# Patient Record
Sex: MALE | Race: WHITE | NOT HISPANIC OR LATINO | Employment: OTHER
[De-identification: names, ages, dates, MRNs, and addresses within clinical notes are randomized per-mention and may not be internally consistent; named-entity substitution may affect disease eponyms.]

---

## 2017-01-30 ENCOUNTER — FOLLOW UP (OUTPATIENT)
Age: 82
End: 2017-01-30

## 2017-01-30 DIAGNOSIS — H40.1130: ICD-10-CM

## 2017-01-30 DIAGNOSIS — Z96.1: ICD-10-CM

## 2017-01-30 DIAGNOSIS — H35.3211: ICD-10-CM

## 2017-01-30 DIAGNOSIS — H43.813: ICD-10-CM

## 2017-01-30 DIAGNOSIS — H35.3221: ICD-10-CM

## 2017-01-30 PROCEDURE — 92250 FUNDUS PHOTOGRAPHY W/I&R: CPT

## 2017-01-30 PROCEDURE — 92235 FLUORESCEIN ANGRPH MLTIFRAME: CPT

## 2017-01-30 PROCEDURE — 92134 CPTRZ OPH DX IMG PST SGM RTA: CPT

## 2017-01-30 PROCEDURE — 92014 COMPRE OPH EXAM EST PT 1/>: CPT

## 2017-01-30 ASSESSMENT — TONOMETRY
OD_IOP_MMHG: 23
OS_IOP_MMHG: 18

## 2017-01-30 ASSESSMENT — VISUAL ACUITY
OD_SC: 20/30+2
OS_SC: 20/200

## 2017-02-06 ENCOUNTER — FOLLOW UP (OUTPATIENT)
Age: 82
End: 2017-02-06

## 2017-02-06 DIAGNOSIS — H35.3221: ICD-10-CM

## 2017-02-06 DIAGNOSIS — H35.3211: ICD-10-CM

## 2017-02-06 PROCEDURE — 67028 INJECTION EYE DRUG: CPT | Mod: 50

## 2017-02-06 ASSESSMENT — VISUAL ACUITY
OS_SC: 20/400
OS_PH: 20/200
OD_SC: 20/20-2

## 2017-02-06 ASSESSMENT — TONOMETRY
OD_IOP_MMHG: 20
OS_IOP_MMHG: 15

## 2017-02-10 ENCOUNTER — FOLLOW UP (OUTPATIENT)
Age: 82
End: 2017-02-10

## 2017-02-10 DIAGNOSIS — H35.3211: ICD-10-CM

## 2017-02-10 DIAGNOSIS — H43.813: ICD-10-CM

## 2017-02-10 DIAGNOSIS — H35.3221: ICD-10-CM

## 2017-02-10 PROCEDURE — 92012 INTRM OPH EXAM EST PATIENT: CPT

## 2017-02-10 ASSESSMENT — TONOMETRY
OD_IOP_MMHG: 24
OD_IOP_MMHG: 23

## 2017-02-10 ASSESSMENT — VISUAL ACUITY: OD_SC: 20/25-1

## 2017-02-23 ENCOUNTER — UNSCHEDULED FOLLOW UP (OUTPATIENT)
Age: 82
End: 2017-02-23

## 2017-02-23 DIAGNOSIS — H04.123: ICD-10-CM

## 2017-02-23 DIAGNOSIS — H43.813: ICD-10-CM

## 2017-02-23 DIAGNOSIS — H35.3221: ICD-10-CM

## 2017-02-23 DIAGNOSIS — H35.3211: ICD-10-CM

## 2017-02-23 DIAGNOSIS — H01.003: ICD-10-CM

## 2017-02-23 PROCEDURE — 92012 INTRM OPH EXAM EST PATIENT: CPT

## 2017-02-23 ASSESSMENT — TONOMETRY: OD_IOP_MMHG: 18

## 2017-02-23 ASSESSMENT — VISUAL ACUITY: OD_SC: 20/25-1

## 2017-03-06 ENCOUNTER — FOLLOW UP (OUTPATIENT)
Age: 82
End: 2017-03-06

## 2017-03-06 DIAGNOSIS — H35.3221: ICD-10-CM

## 2017-03-06 DIAGNOSIS — H04.123: ICD-10-CM

## 2017-03-06 DIAGNOSIS — H01.003: ICD-10-CM

## 2017-03-06 DIAGNOSIS — H43.813: ICD-10-CM

## 2017-03-06 DIAGNOSIS — H40.1130: ICD-10-CM

## 2017-03-06 DIAGNOSIS — H35.3211: ICD-10-CM

## 2017-03-06 PROCEDURE — 05MG LUCENTIS 0.5MG PREFILLED SYRINGE

## 2017-03-06 PROCEDURE — 92014 COMPRE OPH EXAM EST PT 1/>: CPT | Mod: 25

## 2017-03-06 PROCEDURE — 92134 CPTRZ OPH DX IMG PST SGM RTA: CPT

## 2017-03-06 PROCEDURE — 67028 INJECTION EYE DRUG: CPT

## 2017-03-06 ASSESSMENT — TONOMETRY
OD_IOP_MMHG: 21
OS_IOP_MMHG: 22

## 2017-03-06 ASSESSMENT — VISUAL ACUITY
OD_PH: 20/30
OD_SC: 20/30-2
OS_SC: 20/400

## 2017-04-03 ENCOUNTER — FOLLOW UP (OUTPATIENT)
Age: 82
End: 2017-04-03

## 2017-04-03 DIAGNOSIS — H04.123: ICD-10-CM

## 2017-04-03 DIAGNOSIS — H35.3221: ICD-10-CM

## 2017-04-03 DIAGNOSIS — H01.003: ICD-10-CM

## 2017-04-03 DIAGNOSIS — H35.3211: ICD-10-CM

## 2017-04-03 DIAGNOSIS — H43.813: ICD-10-CM

## 2017-04-03 DIAGNOSIS — H40.1130: ICD-10-CM

## 2017-04-03 PROCEDURE — 05MG LUCENTIS 0.5MG PREFILLED SYRINGE

## 2017-04-03 PROCEDURE — 92134 CPTRZ OPH DX IMG PST SGM RTA: CPT

## 2017-04-03 PROCEDURE — 67028 INJECTION EYE DRUG: CPT

## 2017-04-03 PROCEDURE — 92014 COMPRE OPH EXAM EST PT 1/>: CPT | Mod: 25

## 2017-04-03 ASSESSMENT — VISUAL ACUITY
OD_SC: 20/25
OD_PH: 20/25+2
OS_SC: 20/400

## 2017-04-03 ASSESSMENT — TONOMETRY
OS_IOP_MMHG: 14
OD_IOP_MMHG: 18

## 2017-05-08 ENCOUNTER — FOLLOW UP (OUTPATIENT)
Age: 82
End: 2017-05-08

## 2017-05-08 DIAGNOSIS — H35.3221: ICD-10-CM

## 2017-05-08 DIAGNOSIS — H43.813: ICD-10-CM

## 2017-05-08 DIAGNOSIS — H40.1130: ICD-10-CM

## 2017-05-08 DIAGNOSIS — H01.003: ICD-10-CM

## 2017-05-08 DIAGNOSIS — H04.123: ICD-10-CM

## 2017-05-08 DIAGNOSIS — H35.3211: ICD-10-CM

## 2017-05-08 PROCEDURE — 92014 COMPRE OPH EXAM EST PT 1/>: CPT | Mod: 25

## 2017-05-08 PROCEDURE — 92134 CPTRZ OPH DX IMG PST SGM RTA: CPT

## 2017-05-08 PROCEDURE — 67028 INJECTION EYE DRUG: CPT

## 2017-05-08 ASSESSMENT — TONOMETRY
OD_IOP_MMHG: 15
OS_IOP_MMHG: 13

## 2017-05-08 ASSESSMENT — VISUAL ACUITY
OD_SC: 20/25-1
OS_SC: 20/400

## 2017-06-05 ENCOUNTER — FOLLOW UP (OUTPATIENT)
Age: 82
End: 2017-06-05

## 2017-06-05 DIAGNOSIS — H35.3211: ICD-10-CM

## 2017-06-05 DIAGNOSIS — H04.123: ICD-10-CM

## 2017-06-05 DIAGNOSIS — H35.3221: ICD-10-CM

## 2017-06-05 DIAGNOSIS — H43.813: ICD-10-CM

## 2017-06-05 DIAGNOSIS — H40.1130: ICD-10-CM

## 2017-06-05 PROCEDURE — 92014 COMPRE OPH EXAM EST PT 1/>: CPT | Mod: 25

## 2017-06-05 PROCEDURE — 92134 CPTRZ OPH DX IMG PST SGM RTA: CPT

## 2017-06-05 PROCEDURE — 67028 INJECTION EYE DRUG: CPT

## 2017-06-05 PROCEDURE — 05MG LUCENTIS 0.5MG PREFILLED SYRINGE

## 2017-06-05 ASSESSMENT — VISUAL ACUITY
OS_CC: 20/200
OD_CC: 20/30

## 2017-06-05 ASSESSMENT — TONOMETRY
OS_IOP_MMHG: 13
OD_IOP_MMHG: 15

## 2017-07-11 ENCOUNTER — FOLLOW UP (OUTPATIENT)
Age: 82
End: 2017-07-11

## 2017-07-11 DIAGNOSIS — H43.813: ICD-10-CM

## 2017-07-11 DIAGNOSIS — H04.123: ICD-10-CM

## 2017-07-11 DIAGNOSIS — H35.3211: ICD-10-CM

## 2017-07-11 DIAGNOSIS — H35.3221: ICD-10-CM

## 2017-07-11 DIAGNOSIS — H40.1130: ICD-10-CM

## 2017-07-11 PROCEDURE — 67028 INJECTION EYE DRUG: CPT

## 2017-07-11 PROCEDURE — 92134 CPTRZ OPH DX IMG PST SGM RTA: CPT

## 2017-07-11 PROCEDURE — 92014 COMPRE OPH EXAM EST PT 1/>: CPT | Mod: 25

## 2017-07-11 PROCEDURE — 05MG LUCENTIS 0.5MG PREFILLED SYRINGE

## 2017-07-11 ASSESSMENT — VISUAL ACUITY
OS_SC: 20/200
OD_SC: 20/25-1
OS_PH: 20/200+1

## 2017-07-11 ASSESSMENT — TONOMETRY
OS_IOP_MMHG: 16
OD_IOP_MMHG: 18

## 2017-08-21 ENCOUNTER — FOLLOW UP (OUTPATIENT)
Age: 82
End: 2017-08-21

## 2017-08-21 DIAGNOSIS — H43.813: ICD-10-CM

## 2017-08-21 DIAGNOSIS — H35.3221: ICD-10-CM

## 2017-08-21 DIAGNOSIS — H35.3211: ICD-10-CM

## 2017-08-21 DIAGNOSIS — H40.1130: ICD-10-CM

## 2017-08-21 DIAGNOSIS — H04.123: ICD-10-CM

## 2017-08-21 PROCEDURE — 05MG LUCENTIS 0.5MG PREFILLED SYRINGE

## 2017-08-21 PROCEDURE — 67028 INJECTION EYE DRUG: CPT

## 2017-08-21 PROCEDURE — 92014 COMPRE OPH EXAM EST PT 1/>: CPT | Mod: 25

## 2017-08-21 PROCEDURE — 92134 CPTRZ OPH DX IMG PST SGM RTA: CPT

## 2017-08-21 ASSESSMENT — VISUAL ACUITY
OS_CC: 20/400
OD_CC: 20/25-2
OS_PH: 20/200

## 2017-08-21 ASSESSMENT — TONOMETRY
OS_IOP_MMHG: 21
OD_IOP_MMHG: 23

## 2017-10-09 ENCOUNTER — FOLLOW UP (OUTPATIENT)
Age: 82
End: 2017-10-09

## 2017-10-09 DIAGNOSIS — H04.123: ICD-10-CM

## 2017-10-09 DIAGNOSIS — H35.3222: ICD-10-CM

## 2017-10-09 DIAGNOSIS — H40.1130: ICD-10-CM

## 2017-10-09 DIAGNOSIS — H43.813: ICD-10-CM

## 2017-10-09 DIAGNOSIS — H35.3211: ICD-10-CM

## 2017-10-09 PROCEDURE — 05MG LUCENTIS 0.5MG PREFILLED SYRINGE

## 2017-10-09 PROCEDURE — 92014 COMPRE OPH EXAM EST PT 1/>: CPT | Mod: 25

## 2017-10-09 PROCEDURE — 92134 CPTRZ OPH DX IMG PST SGM RTA: CPT

## 2017-10-09 PROCEDURE — 92499 UNLISTED OPH SVC/PROCEDURE: CPT

## 2017-10-09 PROCEDURE — 67028 INJECTION EYE DRUG: CPT

## 2017-10-09 ASSESSMENT — VISUAL ACUITY
OS_SC: 20/200
OS_PH: 20/100-1
OD_SC: 20/30+1

## 2017-10-09 ASSESSMENT — TONOMETRY
OS_IOP_MMHG: 15
OD_IOP_MMHG: 17

## 2017-12-04 ENCOUNTER — FOLLOW UP (OUTPATIENT)
Age: 82
End: 2017-12-04

## 2017-12-04 DIAGNOSIS — H40.1130: ICD-10-CM

## 2017-12-04 DIAGNOSIS — H35.3222: ICD-10-CM

## 2017-12-04 DIAGNOSIS — H04.123: ICD-10-CM

## 2017-12-04 DIAGNOSIS — H43.813: ICD-10-CM

## 2017-12-04 DIAGNOSIS — H35.3211: ICD-10-CM

## 2017-12-04 PROCEDURE — 67028 INJECTION EYE DRUG: CPT

## 2017-12-04 PROCEDURE — 92014 COMPRE OPH EXAM EST PT 1/>: CPT | Mod: 25

## 2017-12-04 PROCEDURE — 05MG LUCENTIS 0.5MG PREFILLED SYRINGE

## 2017-12-04 PROCEDURE — 92134 CPTRZ OPH DX IMG PST SGM RTA: CPT

## 2017-12-04 ASSESSMENT — VISUAL ACUITY
OD_SC: 20/40-2
OS_SC: 20/200

## 2017-12-04 ASSESSMENT — TONOMETRY
OS_IOP_MMHG: 12
OD_IOP_MMHG: 16

## 2018-01-29 ENCOUNTER — FOLLOW UP (OUTPATIENT)
Age: 83
End: 2018-01-29

## 2018-01-29 DIAGNOSIS — H43.813: ICD-10-CM

## 2018-01-29 DIAGNOSIS — H35.3222: ICD-10-CM

## 2018-01-29 DIAGNOSIS — H40.1130: ICD-10-CM

## 2018-01-29 DIAGNOSIS — H04.123: ICD-10-CM

## 2018-01-29 DIAGNOSIS — H35.3211: ICD-10-CM

## 2018-01-29 PROCEDURE — 92134 CPTRZ OPH DX IMG PST SGM RTA: CPT

## 2018-01-29 PROCEDURE — 05MG LUCENTIS 0.5MG PREFILLED SYRINGE

## 2018-01-29 PROCEDURE — 92014 COMPRE OPH EXAM EST PT 1/>: CPT | Mod: 25

## 2018-01-29 PROCEDURE — 67028 INJECTION EYE DRUG: CPT

## 2018-01-29 ASSESSMENT — VISUAL ACUITY
OD_SC: 20/30-
OS_SC: 20/400

## 2018-01-29 ASSESSMENT — TONOMETRY
OD_IOP_MMHG: 20
OS_IOP_MMHG: 14

## 2018-03-26 ENCOUNTER — FOLLOW UP (OUTPATIENT)
Age: 83
End: 2018-03-26

## 2018-03-26 DIAGNOSIS — H40.1130: ICD-10-CM

## 2018-03-26 DIAGNOSIS — H35.3211: ICD-10-CM

## 2018-03-26 DIAGNOSIS — H04.123: ICD-10-CM

## 2018-03-26 DIAGNOSIS — H35.3222: ICD-10-CM

## 2018-03-26 DIAGNOSIS — H43.813: ICD-10-CM

## 2018-03-26 PROCEDURE — 92134 CPTRZ OPH DX IMG PST SGM RTA: CPT

## 2018-03-26 PROCEDURE — 67028 INJECTION EYE DRUG: CPT

## 2018-03-26 PROCEDURE — 05MG LUCENTIS 0.5MG PREFILLED SYRINGE

## 2018-03-26 PROCEDURE — 92014 COMPRE OPH EXAM EST PT 1/>: CPT | Mod: 25

## 2018-03-26 ASSESSMENT — TONOMETRY
OD_IOP_MMHG: 13
OS_IOP_MMHG: 12

## 2018-03-26 ASSESSMENT — VISUAL ACUITY
OS_SC: 20/400
OD_SC: 20/40-2

## 2018-05-14 ENCOUNTER — FOLLOW UP (OUTPATIENT)
Age: 83
End: 2018-05-14

## 2018-05-14 DIAGNOSIS — H04.123: ICD-10-CM

## 2018-05-14 DIAGNOSIS — H43.813: ICD-10-CM

## 2018-05-14 DIAGNOSIS — H40.1130: ICD-10-CM

## 2018-05-14 DIAGNOSIS — H35.3211: ICD-10-CM

## 2018-05-14 DIAGNOSIS — H35.3222: ICD-10-CM

## 2018-05-14 PROCEDURE — 92250 FUNDUS PHOTOGRAPHY W/I&R: CPT

## 2018-05-14 PROCEDURE — 92014 COMPRE OPH EXAM EST PT 1/>: CPT | Mod: 25

## 2018-05-14 PROCEDURE — 92134 CPTRZ OPH DX IMG PST SGM RTA: CPT

## 2018-05-14 PROCEDURE — 05MG LUCENTIS 0.5MG PREFILLED SYRINGE

## 2018-05-14 PROCEDURE — 67028 INJECTION EYE DRUG: CPT

## 2018-05-14 ASSESSMENT — TONOMETRY
OS_IOP_MMHG: 12
OD_IOP_MMHG: 15

## 2018-05-14 ASSESSMENT — VISUAL ACUITY
OD_CC: 20/40-2
OS_CC: 20/400

## 2018-06-25 ENCOUNTER — FOLLOW UP (OUTPATIENT)
Age: 83
End: 2018-06-25

## 2018-06-25 DIAGNOSIS — H35.3211: ICD-10-CM

## 2018-06-25 DIAGNOSIS — H43.813: ICD-10-CM

## 2018-06-25 DIAGNOSIS — H40.1130: ICD-10-CM

## 2018-06-25 DIAGNOSIS — H35.3222: ICD-10-CM

## 2018-06-25 DIAGNOSIS — H04.123: ICD-10-CM

## 2018-06-25 PROCEDURE — 67028 INJECTION EYE DRUG: CPT

## 2018-06-25 PROCEDURE — 05MG LUCENTIS 0.5MG PREFILLED SYRINGE

## 2018-06-25 PROCEDURE — 92014 COMPRE OPH EXAM EST PT 1/>: CPT | Mod: 25

## 2018-06-25 PROCEDURE — 92134 CPTRZ OPH DX IMG PST SGM RTA: CPT

## 2018-06-25 ASSESSMENT — TONOMETRY
OS_IOP_MMHG: 10
OD_IOP_MMHG: 14

## 2018-06-25 ASSESSMENT — VISUAL ACUITY
OS_SC: 20/400
OD_SC: 20/40+1

## 2018-08-13 ENCOUNTER — FOLLOW UP (OUTPATIENT)
Age: 83
End: 2018-08-13

## 2018-08-13 DIAGNOSIS — H35.3222: ICD-10-CM

## 2018-08-13 DIAGNOSIS — H04.123: ICD-10-CM

## 2018-08-13 DIAGNOSIS — H43.813: ICD-10-CM

## 2018-08-13 DIAGNOSIS — H35.3211: ICD-10-CM

## 2018-08-13 DIAGNOSIS — H40.1130: ICD-10-CM

## 2018-08-13 PROCEDURE — 05MG LUCENTIS 0.5MG PREFILLED SYRINGE

## 2018-08-13 PROCEDURE — 92250 FUNDUS PHOTOGRAPHY W/I&R: CPT

## 2018-08-13 PROCEDURE — 67028 INJECTION EYE DRUG: CPT

## 2018-08-13 PROCEDURE — 92014 COMPRE OPH EXAM EST PT 1/>: CPT | Mod: 25

## 2018-08-13 PROCEDURE — 92134 CPTRZ OPH DX IMG PST SGM RTA: CPT

## 2018-08-13 ASSESSMENT — VISUAL ACUITY
OD_SC: 20/50
OS_SC: 20/400

## 2018-08-13 ASSESSMENT — TONOMETRY
OD_IOP_MMHG: 15
OS_IOP_MMHG: 13

## 2018-10-08 ENCOUNTER — FOLLOW UP (OUTPATIENT)
Age: 83
End: 2018-10-08

## 2018-10-08 DIAGNOSIS — H40.1130: ICD-10-CM

## 2018-10-08 DIAGNOSIS — H35.3222: ICD-10-CM

## 2018-10-08 DIAGNOSIS — H43.813: ICD-10-CM

## 2018-10-08 DIAGNOSIS — H04.123: ICD-10-CM

## 2018-10-08 DIAGNOSIS — H35.3211: ICD-10-CM

## 2018-10-08 PROCEDURE — 05MG LUCENTIS 0.5MG PREFILLED SYRINGE

## 2018-10-08 PROCEDURE — 92014 COMPRE OPH EXAM EST PT 1/>: CPT | Mod: 25

## 2018-10-08 PROCEDURE — 92134 CPTRZ OPH DX IMG PST SGM RTA: CPT

## 2018-10-08 PROCEDURE — 67028 INJECTION EYE DRUG: CPT

## 2018-10-08 ASSESSMENT — VISUAL ACUITY
OS_SC: 20/200-1
OD_SC: 20/50+1

## 2018-10-08 ASSESSMENT — TONOMETRY
OS_IOP_MMHG: 14
OD_IOP_MMHG: 16

## 2018-12-03 ENCOUNTER — FOLLOW UP (OUTPATIENT)
Age: 83
End: 2018-12-03

## 2018-12-03 DIAGNOSIS — H43.813: ICD-10-CM

## 2018-12-03 DIAGNOSIS — H40.1130: ICD-10-CM

## 2018-12-03 DIAGNOSIS — H35.3222: ICD-10-CM

## 2018-12-03 DIAGNOSIS — H04.123: ICD-10-CM

## 2018-12-03 DIAGNOSIS — H35.3211: ICD-10-CM

## 2018-12-03 PROCEDURE — 05MG LUCENTIS 0.5MG PREFILLED SYRINGE

## 2018-12-03 PROCEDURE — 67028 INJECTION EYE DRUG: CPT

## 2018-12-03 PROCEDURE — 92134 CPTRZ OPH DX IMG PST SGM RTA: CPT

## 2018-12-03 PROCEDURE — 92014 COMPRE OPH EXAM EST PT 1/>: CPT | Mod: 25

## 2018-12-03 ASSESSMENT — VISUAL ACUITY
OD_SC: 20/40-2
OS_SC: 20/200-1

## 2018-12-03 ASSESSMENT — TONOMETRY
OD_IOP_MMHG: 18
OS_IOP_MMHG: 17

## 2019-01-28 ENCOUNTER — FOLLOW UP (OUTPATIENT)
Age: 84
End: 2019-01-28

## 2019-01-28 DIAGNOSIS — H40.1130: ICD-10-CM

## 2019-01-28 DIAGNOSIS — H43.813: ICD-10-CM

## 2019-01-28 DIAGNOSIS — H35.3222: ICD-10-CM

## 2019-01-28 DIAGNOSIS — H04.123: ICD-10-CM

## 2019-01-28 DIAGNOSIS — H35.3211: ICD-10-CM

## 2019-01-28 PROCEDURE — 92014 COMPRE OPH EXAM EST PT 1/>: CPT | Mod: 25

## 2019-01-28 PROCEDURE — 67028 INJECTION EYE DRUG: CPT

## 2019-01-28 PROCEDURE — 92134 CPTRZ OPH DX IMG PST SGM RTA: CPT

## 2019-01-28 PROCEDURE — 92250 FUNDUS PHOTOGRAPHY W/I&R: CPT

## 2019-01-28 PROCEDURE — 05MG LUCENTIS 0.5MG PREFILLED SYRINGE

## 2019-01-28 ASSESSMENT — VISUAL ACUITY
OD_SC: 20/50-2
OS_SC: 20/200-1
OD_PH: 20/50+2

## 2019-01-28 ASSESSMENT — TONOMETRY
OD_IOP_MMHG: 18
OS_IOP_MMHG: 14

## 2019-01-31 ENCOUNTER — UNSCHEDULED FOLLOW UP (OUTPATIENT)
Age: 84
End: 2019-01-31

## 2019-01-31 DIAGNOSIS — H35.3222: ICD-10-CM

## 2019-01-31 DIAGNOSIS — H40.1130: ICD-10-CM

## 2019-01-31 DIAGNOSIS — H43.813: ICD-10-CM

## 2019-01-31 DIAGNOSIS — H35.3211: ICD-10-CM

## 2019-01-31 DIAGNOSIS — H16.141: ICD-10-CM

## 2019-01-31 DIAGNOSIS — H04.123: ICD-10-CM

## 2019-01-31 PROCEDURE — 92012 INTRM OPH EXAM EST PATIENT: CPT

## 2019-01-31 ASSESSMENT — TONOMETRY
OD_IOP_MMHG: 16
OS_IOP_MMHG: 14

## 2019-01-31 ASSESSMENT — VISUAL ACUITY
OS_SC: 20/200+1
OD_SC: 20/50-2
OD_PH: 20/60+2

## 2019-03-25 ENCOUNTER — FOLLOW UP (OUTPATIENT)
Age: 84
End: 2019-03-25

## 2019-03-25 DIAGNOSIS — H04.123: ICD-10-CM

## 2019-03-25 DIAGNOSIS — H35.3211: ICD-10-CM

## 2019-03-25 DIAGNOSIS — H35.3222: ICD-10-CM

## 2019-03-25 DIAGNOSIS — H16.141: ICD-10-CM

## 2019-03-25 DIAGNOSIS — H43.813: ICD-10-CM

## 2019-03-25 DIAGNOSIS — H40.1130: ICD-10-CM

## 2019-03-25 PROCEDURE — 05MG LUCENTIS 0.5MG PREFILLED SYRINGE

## 2019-03-25 PROCEDURE — 67028 INJECTION EYE DRUG: CPT

## 2019-03-25 PROCEDURE — 92134 CPTRZ OPH DX IMG PST SGM RTA: CPT

## 2019-03-25 PROCEDURE — 92014 COMPRE OPH EXAM EST PT 1/>: CPT | Mod: 25

## 2019-03-25 ASSESSMENT — VISUAL ACUITY
OS_SC: 20/200-1
OD_SC: 20/60+2

## 2019-03-25 ASSESSMENT — TONOMETRY
OS_IOP_MMHG: 17
OD_IOP_MMHG: 13

## 2019-05-20 ENCOUNTER — FOLLOW UP (OUTPATIENT)
Age: 84
End: 2019-05-20

## 2019-05-20 DIAGNOSIS — H04.123: ICD-10-CM

## 2019-05-20 DIAGNOSIS — H40.1130: ICD-10-CM

## 2019-05-20 DIAGNOSIS — H35.3222: ICD-10-CM

## 2019-05-20 DIAGNOSIS — H16.141: ICD-10-CM

## 2019-05-20 DIAGNOSIS — H35.3211: ICD-10-CM

## 2019-05-20 DIAGNOSIS — H43.813: ICD-10-CM

## 2019-05-20 PROCEDURE — 92134 CPTRZ OPH DX IMG PST SGM RTA: CPT

## 2019-05-20 PROCEDURE — 05MG LUCENTIS 0.5MG PREFILLED SYRINGE

## 2019-05-20 PROCEDURE — 92014 COMPRE OPH EXAM EST PT 1/>: CPT | Mod: 25

## 2019-05-20 PROCEDURE — 67028 INJECTION EYE DRUG: CPT

## 2019-05-20 ASSESSMENT — TONOMETRY
OD_IOP_MMHG: 17
OS_IOP_MMHG: 15

## 2019-05-20 ASSESSMENT — VISUAL ACUITY
OD_SC: 20/60+2
OS_SC: 20/200+1
OD_PH: 20/30-2

## 2019-07-08 ENCOUNTER — FOLLOW UP (OUTPATIENT)
Age: 84
End: 2019-07-08

## 2019-07-08 DIAGNOSIS — H43.813: ICD-10-CM

## 2019-07-08 DIAGNOSIS — H35.3211: ICD-10-CM

## 2019-07-08 DIAGNOSIS — H40.1130: ICD-10-CM

## 2019-07-08 DIAGNOSIS — H04.123: ICD-10-CM

## 2019-07-08 DIAGNOSIS — H35.3222: ICD-10-CM

## 2019-07-08 DIAGNOSIS — H16.141: ICD-10-CM

## 2019-07-08 PROCEDURE — 67028 INJECTION EYE DRUG: CPT

## 2019-07-08 PROCEDURE — 92134 CPTRZ OPH DX IMG PST SGM RTA: CPT

## 2019-07-08 PROCEDURE — 92014 COMPRE OPH EXAM EST PT 1/>: CPT | Mod: 25

## 2019-07-08 PROCEDURE — 05MG LUCENTIS 0.5MG PREFILLED SYRINGE

## 2019-07-08 ASSESSMENT — VISUAL ACUITY
OS_SC: 20/100
OD_SC: 20/50+2

## 2019-07-08 ASSESSMENT — TONOMETRY
OD_IOP_MMHG: 17
OS_IOP_MMHG: 16

## 2019-09-09 ENCOUNTER — FOLLOW UP (OUTPATIENT)
Age: 84
End: 2019-09-09

## 2019-09-09 DIAGNOSIS — H16.141: ICD-10-CM

## 2019-09-09 DIAGNOSIS — H43.813: ICD-10-CM

## 2019-09-09 DIAGNOSIS — H35.3211: ICD-10-CM

## 2019-09-09 DIAGNOSIS — H04.123: ICD-10-CM

## 2019-09-09 DIAGNOSIS — H35.3222: ICD-10-CM

## 2019-09-09 DIAGNOSIS — H40.1130: ICD-10-CM

## 2019-09-09 PROCEDURE — 92014 COMPRE OPH EXAM EST PT 1/>: CPT | Mod: 25

## 2019-09-09 PROCEDURE — 67028 INJECTION EYE DRUG: CPT

## 2019-09-09 PROCEDURE — 92134 CPTRZ OPH DX IMG PST SGM RTA: CPT

## 2019-09-09 PROCEDURE — 05MG LUCENTIS 0.5MG PREFILLED SYRINGE

## 2019-09-09 ASSESSMENT — TONOMETRY
OD_IOP_MMHG: 17
OS_IOP_MMHG: 15

## 2019-09-09 ASSESSMENT — VISUAL ACUITY
OS_SC: 20/200
OD_SC: 20/60-1

## 2019-09-23 ENCOUNTER — RX ONLY (OUTPATIENT)
Age: 84
Setting detail: RX ONLY
End: 2019-09-23

## 2019-09-23 ENCOUNTER — APPOINTMENT (RX ONLY)
Dept: URBAN - METROPOLITAN AREA CLINIC 344 | Facility: CLINIC | Age: 84
Setting detail: DERMATOLOGY
End: 2019-09-23

## 2019-09-23 DIAGNOSIS — D18.0 HEMANGIOMA: ICD-10-CM

## 2019-09-23 DIAGNOSIS — L259 CONTACT DERMATITIS AND OTHER ECZEMA, UNSPECIFIED CAUSE: ICD-10-CM

## 2019-09-23 DIAGNOSIS — L21.8 OTHER SEBORRHEIC DERMATITIS: ICD-10-CM

## 2019-09-23 DIAGNOSIS — D22 MELANOCYTIC NEVI: ICD-10-CM

## 2019-09-23 DIAGNOSIS — L23.1 ALLERGIC CONTACT DERMATITIS DUE TO ADHESIVES: ICD-10-CM

## 2019-09-23 PROBLEM — L23.9 ALLERGIC CONTACT DERMATITIS, UNSPECIFIED CAUSE: Status: ACTIVE | Noted: 2019-09-23

## 2019-09-23 PROBLEM — E78.5 HYPERLIPIDEMIA, UNSPECIFIED: Status: ACTIVE | Noted: 2019-09-23

## 2019-09-23 PROBLEM — D18.01 HEMANGIOMA OF SKIN AND SUBCUTANEOUS TISSUE: Status: ACTIVE | Noted: 2019-09-23

## 2019-09-23 PROBLEM — D22.5 MELANOCYTIC NEVI OF TRUNK: Status: ACTIVE | Noted: 2019-09-23

## 2019-09-23 PROCEDURE — 99202 OFFICE O/P NEW SF 15 MIN: CPT

## 2019-09-23 PROCEDURE — ? PRESCRIPTION

## 2019-09-23 PROCEDURE — ? COUNSELING

## 2019-09-23 PROCEDURE — ? TREATMENT REGIMEN

## 2019-09-23 RX ORDER — FLUOCINONIDE 0.5 MG/ML
SOLUTION TOPICAL
Qty: 1 | Refills: 1 | Status: ERX | COMMUNITY
Start: 2019-09-23

## 2019-09-23 RX ORDER — TRIAMCINOLONE ACETONIDE 1 MG/G
CREAM TOPICAL
Qty: 1 | Refills: 1 | Status: ERX | COMMUNITY
Start: 2019-09-23

## 2019-09-23 RX ADMIN — TRIAMCINOLONE ACETONIDE: 1 CREAM TOPICAL at 00:00

## 2019-09-23 ASSESSMENT — LOCATION DETAILED DESCRIPTION DERM
LOCATION DETAILED: LEFT MEDIAL SUPERIOR CHEST
LOCATION DETAILED: MID-OCCIPITAL SCALP
LOCATION DETAILED: RIGHT MID-UPPER BACK
LOCATION DETAILED: LEFT SUPERIOR MEDIAL UPPER BACK
LOCATION DETAILED: INFERIOR LUMBAR SPINE
LOCATION DETAILED: RIGHT SUPERIOR UPPER BACK

## 2019-09-23 ASSESSMENT — LOCATION SIMPLE DESCRIPTION DERM
LOCATION SIMPLE: RIGHT UPPER BACK
LOCATION SIMPLE: LOWER BACK
LOCATION SIMPLE: CHEST
LOCATION SIMPLE: POSTERIOR SCALP
LOCATION SIMPLE: LEFT UPPER BACK

## 2019-09-23 ASSESSMENT — LOCATION ZONE DERM
LOCATION ZONE: SCALP
LOCATION ZONE: TRUNK

## 2019-09-23 NOTE — PROCEDURE: TREATMENT REGIMEN
Detail Level: Zone
Plan: Will consider biopsy at follow up if no improvement
Initiate Treatment: Fluocinonide scalp,solution: apply to affected areas of scalp bid for two weeks then as needed from r flares
Initiate Treatment: Triamcinolone cream: apply to affected areas twice daily for two weeks
Initiate Treatment: Triamcinolone cream: apply to affected areas twice daily for two weeks then as needed for flares

## 2019-10-14 ENCOUNTER — APPOINTMENT (RX ONLY)
Dept: URBAN - METROPOLITAN AREA CLINIC 344 | Facility: CLINIC | Age: 84
Setting detail: DERMATOLOGY
End: 2019-10-14

## 2019-10-14 DIAGNOSIS — L21.8 OTHER SEBORRHEIC DERMATITIS: ICD-10-CM | Status: RESOLVED

## 2019-10-14 DIAGNOSIS — L259 CONTACT DERMATITIS AND OTHER ECZEMA, UNSPECIFIED CAUSE: ICD-10-CM | Status: RESOLVED

## 2019-10-14 PROBLEM — I10 ESSENTIAL (PRIMARY) HYPERTENSION: Status: ACTIVE | Noted: 2019-10-14

## 2019-10-14 PROBLEM — L23.9 ALLERGIC CONTACT DERMATITIS, UNSPECIFIED CAUSE: Status: ACTIVE | Noted: 2019-10-14

## 2019-10-14 PROCEDURE — ? COUNSELING

## 2019-10-14 PROCEDURE — ? TREATMENT REGIMEN

## 2019-10-14 PROCEDURE — 99213 OFFICE O/P EST LOW 20 MIN: CPT

## 2019-10-14 ASSESSMENT — LOCATION SIMPLE DESCRIPTION DERM
LOCATION SIMPLE: LEFT UPPER BACK
LOCATION SIMPLE: POSTERIOR SCALP
LOCATION SIMPLE: CHEST

## 2019-10-14 ASSESSMENT — LOCATION DETAILED DESCRIPTION DERM
LOCATION DETAILED: LEFT SUPERIOR MEDIAL UPPER BACK
LOCATION DETAILED: LEFT MEDIAL SUPERIOR CHEST
LOCATION DETAILED: MID-OCCIPITAL SCALP

## 2019-10-14 ASSESSMENT — LOCATION ZONE DERM
LOCATION ZONE: SCALP
LOCATION ZONE: TRUNK

## 2019-10-14 NOTE — PROCEDURE: TREATMENT REGIMEN
Detail Level: Zone
Continue Regimen: Fluocinonide scalp,solution: apply to affected areas of scalp when flared
Continue Regimen: Triamcinolone cream: apply to affected areas when flared\\nGentle skin care

## 2019-11-04 ENCOUNTER — FOLLOW UP (OUTPATIENT)
Age: 84
End: 2019-11-04

## 2019-11-04 DIAGNOSIS — H04.123: ICD-10-CM

## 2019-11-04 DIAGNOSIS — H43.813: ICD-10-CM

## 2019-11-04 DIAGNOSIS — H16.141: ICD-10-CM

## 2019-11-04 DIAGNOSIS — H35.3211: ICD-10-CM

## 2019-11-04 DIAGNOSIS — H35.3222: ICD-10-CM

## 2019-11-04 DIAGNOSIS — H40.1130: ICD-10-CM

## 2019-11-04 PROCEDURE — 92014 COMPRE OPH EXAM EST PT 1/>: CPT | Mod: 25

## 2019-11-04 PROCEDURE — 05MG LUCENTIS 0.5MG PREFILLED SYRINGE

## 2019-11-04 PROCEDURE — 92134 CPTRZ OPH DX IMG PST SGM RTA: CPT

## 2019-11-04 PROCEDURE — 67028 INJECTION EYE DRUG: CPT

## 2019-11-04 ASSESSMENT — VISUAL ACUITY
OD_SC: 20/100-1
OS_SC: 20/200-1

## 2019-11-04 ASSESSMENT — TONOMETRY
OD_IOP_MMHG: 17
OS_IOP_MMHG: 18

## 2019-11-07 ENCOUNTER — APPOINTMENT (RX ONLY)
Dept: URBAN - METROPOLITAN AREA CLINIC 347 | Facility: CLINIC | Age: 84
Setting detail: DERMATOLOGY
End: 2019-11-07

## 2019-11-07 DIAGNOSIS — L259 CONTACT DERMATITIS AND OTHER ECZEMA, UNSPECIFIED CAUSE: ICD-10-CM | Status: IMPROVED

## 2019-11-07 PROBLEM — L23.9 ALLERGIC CONTACT DERMATITIS, UNSPECIFIED CAUSE: Status: ACTIVE | Noted: 2019-11-07

## 2019-11-07 PROCEDURE — ? TREATMENT REGIMEN

## 2019-11-07 PROCEDURE — ? COUNSELING

## 2019-11-07 PROCEDURE — 99213 OFFICE O/P EST LOW 20 MIN: CPT

## 2019-11-07 ASSESSMENT — LOCATION ZONE DERM: LOCATION ZONE: TRUNK

## 2019-11-07 ASSESSMENT — LOCATION DETAILED DESCRIPTION DERM
LOCATION DETAILED: RIGHT CLAVICULAR SKIN
LOCATION DETAILED: RIGHT MEDIAL INFERIOR CHEST

## 2019-11-07 ASSESSMENT — LOCATION SIMPLE DESCRIPTION DERM
LOCATION SIMPLE: CHEST
LOCATION SIMPLE: RIGHT CLAVICULAR SKIN

## 2019-11-07 NOTE — PROCEDURE: TREATMENT REGIMEN
Detail Level: Zone
Modify Regimen: Increase frequency of triamcinolone application from 1x weekly —> up to twice daily x 2 weeks then as needed for flares. Do not use on face.
Plan: Patient has only been using TRIAMCINOLONE once a week. He will increase frequency of application and see if it is helpful. \\n\\nPatient advised to d/c head and shoulders as a body wash because it can be very drying. I recommended the Zbar or dove soap
Continue Regimen: Triamcinolone cream: apply to affected areas when flared
Otc Regimen: Gentle skin care\\nHypoallergenic laundry detergents

## 2019-11-07 NOTE — PROCEDURE: MIPS QUALITY
Quality 130: Documentation Of Current Medications In The Medical Record: Current Medications with Name, Dosage, Frequency, or Route not Documented, Reason not Given
Quality 110: Preventive Care And Screening: Influenza Immunization: Influenza Immunization Administered during Influenza season
Quality 111:Pneumonia Vaccination Status For Older Adults: Pneumococcal Vaccination Previously Received
Detail Level: Detailed
Quality 226: Preventive Care And Screening: Tobacco Use: Screening And Cessation Intervention: Patient screened for tobacco use and is an ex/non-smoker
Quality 474: Zoster Vaccination Status: Shingrix Vaccination not Administered or Previously Received, Reason not Otherwise Specified
Quality 47: Advance Care Plan: Advance Care Planning discussed and documented; advance care plan or surrogate decision maker documented in the medical record.

## 2020-01-13 ENCOUNTER — FOLLOW UP (OUTPATIENT)
Age: 85
End: 2020-01-13

## 2020-01-13 DIAGNOSIS — H04.123: ICD-10-CM

## 2020-01-13 DIAGNOSIS — H35.3222: ICD-10-CM

## 2020-01-13 DIAGNOSIS — H43.813: ICD-10-CM

## 2020-01-13 DIAGNOSIS — H35.3211: ICD-10-CM

## 2020-01-13 DIAGNOSIS — H16.141: ICD-10-CM

## 2020-01-13 DIAGNOSIS — H40.1130: ICD-10-CM

## 2020-01-13 PROCEDURE — 92134 CPTRZ OPH DX IMG PST SGM RTA: CPT

## 2020-01-13 PROCEDURE — 67028 INJECTION EYE DRUG: CPT

## 2020-01-13 PROCEDURE — 92014 COMPRE OPH EXAM EST PT 1/>: CPT | Mod: 25

## 2020-01-13 PROCEDURE — 05MG LUCENTIS 0.5MG PREFILLED SYRINGE

## 2020-01-13 ASSESSMENT — TONOMETRY
OD_IOP_MMHG: 14
OS_IOP_MMHG: 14

## 2020-01-13 ASSESSMENT — VISUAL ACUITY
OD_SC: 20/60-1
OS_SC: 20/100

## 2020-04-28 ENCOUNTER — FOLLOW UP (OUTPATIENT)
Age: 85
End: 2020-04-28

## 2020-04-28 DIAGNOSIS — H04.123: ICD-10-CM

## 2020-04-28 DIAGNOSIS — H43.813: ICD-10-CM

## 2020-04-28 DIAGNOSIS — H35.3211: ICD-10-CM

## 2020-04-28 DIAGNOSIS — H40.1130: ICD-10-CM

## 2020-04-28 DIAGNOSIS — H35.3222: ICD-10-CM

## 2020-04-28 PROCEDURE — 92134 CPTRZ OPH DX IMG PST SGM RTA: CPT

## 2020-04-28 PROCEDURE — 67028 INJECTION EYE DRUG: CPT

## 2020-04-28 PROCEDURE — 05MG LUCENTIS 0.5MG PREFILLED SYRINGE

## 2020-04-28 PROCEDURE — 92014 COMPRE OPH EXAM EST PT 1/>: CPT | Mod: 25

## 2020-04-28 ASSESSMENT — VISUAL ACUITY
OD_SC: 20/100-1
OS_SC: 20/200+1

## 2020-04-28 ASSESSMENT — TONOMETRY
OS_IOP_MMHG: 14
OD_IOP_MMHG: 12

## 2020-06-22 ENCOUNTER — FOLLOW UP (OUTPATIENT)
Age: 85
End: 2020-06-22

## 2020-06-22 DIAGNOSIS — H40.1130: ICD-10-CM

## 2020-06-22 DIAGNOSIS — H35.3211: ICD-10-CM

## 2020-06-22 DIAGNOSIS — H43.813: ICD-10-CM

## 2020-06-22 DIAGNOSIS — H35.3222: ICD-10-CM

## 2020-06-22 DIAGNOSIS — H04.123: ICD-10-CM

## 2020-06-22 PROCEDURE — 92134 CPTRZ OPH DX IMG PST SGM RTA: CPT

## 2020-06-22 PROCEDURE — 05MG LUCENTIS 0.5MG PREFILLED SYRINGE

## 2020-06-22 PROCEDURE — 67028 INJECTION EYE DRUG: CPT

## 2020-06-22 PROCEDURE — 92014 COMPRE OPH EXAM EST PT 1/>: CPT | Mod: 25

## 2020-06-22 ASSESSMENT — VISUAL ACUITY
OS_SC: 20/200
OD_PH: 20/40-2
OD_SC: 20/70-1

## 2020-06-22 ASSESSMENT — TONOMETRY
OS_IOP_MMHG: 17
OD_IOP_MMHG: 16

## 2020-08-17 ENCOUNTER — FOLLOW UP (OUTPATIENT)
Age: 85
End: 2020-08-17

## 2020-08-17 DIAGNOSIS — H43.813: ICD-10-CM

## 2020-08-17 DIAGNOSIS — H04.123: ICD-10-CM

## 2020-08-17 DIAGNOSIS — Z96.1: ICD-10-CM

## 2020-08-17 DIAGNOSIS — H35.3222: ICD-10-CM

## 2020-08-17 DIAGNOSIS — H35.3211: ICD-10-CM

## 2020-08-17 DIAGNOSIS — H40.1130: ICD-10-CM

## 2020-08-17 PROCEDURE — 67028 INJECTION EYE DRUG: CPT

## 2020-08-17 PROCEDURE — 92014 COMPRE OPH EXAM EST PT 1/>: CPT | Mod: 25

## 2020-08-17 PROCEDURE — 92134 CPTRZ OPH DX IMG PST SGM RTA: CPT

## 2020-08-17 PROCEDURE — 05MG LUCENTIS 0.5MG PREFILLED SYRINGE

## 2020-08-17 ASSESSMENT — TONOMETRY
OS_IOP_MMHG: 15
OD_IOP_MMHG: 14

## 2020-08-17 ASSESSMENT — VISUAL ACUITY
OD_CC: 20/50-1
OS_CC: 20/200

## 2020-09-28 ENCOUNTER — FOLLOW UP (OUTPATIENT)
Age: 85
End: 2020-09-28

## 2020-09-28 DIAGNOSIS — H40.1130: ICD-10-CM

## 2020-09-28 DIAGNOSIS — H35.3211: ICD-10-CM

## 2020-09-28 DIAGNOSIS — H35.3222: ICD-10-CM

## 2020-09-28 DIAGNOSIS — H04.123: ICD-10-CM

## 2020-09-28 DIAGNOSIS — H43.813: ICD-10-CM

## 2020-09-28 DIAGNOSIS — Z96.1: ICD-10-CM

## 2020-09-28 PROCEDURE — 92134 CPTRZ OPH DX IMG PST SGM RTA: CPT

## 2020-09-28 PROCEDURE — 67028 INJECTION EYE DRUG: CPT

## 2020-09-28 PROCEDURE — 05MG LUCENTIS 0.5MG PREFILLED SYRINGE

## 2020-09-28 PROCEDURE — 92014 COMPRE OPH EXAM EST PT 1/>: CPT | Mod: 25

## 2020-09-28 ASSESSMENT — TONOMETRY
OS_IOP_MMHG: 16
OD_IOP_MMHG: 19

## 2020-09-28 ASSESSMENT — VISUAL ACUITY
OD_PH: 20/60-2
OD_SC: 20/200
OS_SC: 20/200

## 2020-11-09 ENCOUNTER — FOLLOW UP (OUTPATIENT)
Age: 85
End: 2020-11-09

## 2020-11-09 DIAGNOSIS — H35.3211: ICD-10-CM

## 2020-11-09 DIAGNOSIS — H43.813: ICD-10-CM

## 2020-11-09 DIAGNOSIS — H35.3222: ICD-10-CM

## 2020-11-09 DIAGNOSIS — H04.123: ICD-10-CM

## 2020-11-09 DIAGNOSIS — H40.1130: ICD-10-CM

## 2020-11-09 PROCEDURE — 05MG LUCENTIS 0.5MG PREFILLED SYRINGE

## 2020-11-09 PROCEDURE — 92014 COMPRE OPH EXAM EST PT 1/>: CPT | Mod: 25

## 2020-11-09 PROCEDURE — 92134 CPTRZ OPH DX IMG PST SGM RTA: CPT

## 2020-11-09 PROCEDURE — 67028 INJECTION EYE DRUG: CPT

## 2020-11-09 ASSESSMENT — TONOMETRY
OS_IOP_MMHG: 17
OD_IOP_MMHG: 18

## 2020-11-09 ASSESSMENT — VISUAL ACUITY
OS_CC: 20/100-1
OD_CC: 20/100-1
OD_PH: 20/50-2

## 2020-12-21 ENCOUNTER — FOLLOW UP (OUTPATIENT)
Age: 85
End: 2020-12-21

## 2020-12-21 DIAGNOSIS — H35.3222: ICD-10-CM

## 2020-12-21 DIAGNOSIS — H43.813: ICD-10-CM

## 2020-12-21 DIAGNOSIS — H35.3211: ICD-10-CM

## 2020-12-21 DIAGNOSIS — H04.123: ICD-10-CM

## 2020-12-21 DIAGNOSIS — H40.1130: ICD-10-CM

## 2020-12-21 PROCEDURE — 92014 COMPRE OPH EXAM EST PT 1/>: CPT | Mod: 25

## 2020-12-21 PROCEDURE — 92134 CPTRZ OPH DX IMG PST SGM RTA: CPT

## 2020-12-21 PROCEDURE — 05MG LUCENTIS 0.5MG PREFILLED SYRINGE

## 2020-12-21 PROCEDURE — 67028 INJECTION EYE DRUG: CPT

## 2020-12-21 ASSESSMENT — VISUAL ACUITY
OS_SC: 20/100
OD_SC: 20/200
OS_PH: 20/100+1

## 2020-12-21 ASSESSMENT — TONOMETRY
OS_IOP_MMHG: 15
OD_IOP_MMHG: 17

## 2021-02-08 ENCOUNTER — FOLLOW UP (OUTPATIENT)
Age: 86
End: 2021-02-08

## 2021-02-08 DIAGNOSIS — H40.1130: ICD-10-CM

## 2021-02-08 DIAGNOSIS — H35.3211: ICD-10-CM

## 2021-02-08 DIAGNOSIS — H04.123: ICD-10-CM

## 2021-02-08 DIAGNOSIS — Z96.1: ICD-10-CM

## 2021-02-08 DIAGNOSIS — H35.3222: ICD-10-CM

## 2021-02-08 DIAGNOSIS — H43.813: ICD-10-CM

## 2021-02-08 PROCEDURE — 67028 INJECTION EYE DRUG: CPT

## 2021-02-08 PROCEDURE — 92014 COMPRE OPH EXAM EST PT 1/>: CPT | Mod: 25

## 2021-02-08 PROCEDURE — 05MG LUCENTIS 0.5MG PREFILLED SYRINGE

## 2021-02-08 PROCEDURE — 92134 CPTRZ OPH DX IMG PST SGM RTA: CPT

## 2021-02-08 ASSESSMENT — VISUAL ACUITY
OD_SC: 20/100
OS_SC: 20/200+1

## 2021-02-08 ASSESSMENT — TONOMETRY
OS_IOP_MMHG: 20
OD_IOP_MMHG: 18

## 2021-03-29 ENCOUNTER — FOLLOW UP (OUTPATIENT)
Age: 86
End: 2021-03-29

## 2021-03-29 DIAGNOSIS — H40.1130: ICD-10-CM

## 2021-03-29 DIAGNOSIS — H35.3211: ICD-10-CM

## 2021-03-29 DIAGNOSIS — H04.123: ICD-10-CM

## 2021-03-29 DIAGNOSIS — H35.3222: ICD-10-CM

## 2021-03-29 DIAGNOSIS — H43.813: ICD-10-CM

## 2021-03-29 PROCEDURE — 67028 INJECTION EYE DRUG: CPT

## 2021-03-29 PROCEDURE — 05MG LUCENTIS 0.5MG PREFILLED SYRINGE

## 2021-03-29 PROCEDURE — 92134 CPTRZ OPH DX IMG PST SGM RTA: CPT

## 2021-03-29 PROCEDURE — 92014 COMPRE OPH EXAM EST PT 1/>: CPT | Mod: 25

## 2021-03-29 ASSESSMENT — TONOMETRY
OS_IOP_MMHG: 21
OD_IOP_MMHG: 19

## 2021-03-29 ASSESSMENT — VISUAL ACUITY
OS_SC: 20/100-1
OD_SC: 20/80-1

## 2021-05-17 ENCOUNTER — FOLLOW UP (OUTPATIENT)
Age: 86
End: 2021-05-17

## 2021-05-17 DIAGNOSIS — H43.813: ICD-10-CM

## 2021-05-17 DIAGNOSIS — H35.3222: ICD-10-CM

## 2021-05-17 DIAGNOSIS — H35.3211: ICD-10-CM

## 2021-05-17 DIAGNOSIS — H40.1130: ICD-10-CM

## 2021-05-17 DIAGNOSIS — Z96.1: ICD-10-CM

## 2021-05-17 DIAGNOSIS — H04.123: ICD-10-CM

## 2021-05-17 PROCEDURE — 67028 INJECTION EYE DRUG: CPT

## 2021-05-17 PROCEDURE — 05MG LUCENTIS 0.5MG PREFILLED SYRINGE

## 2021-05-17 PROCEDURE — 92014 COMPRE OPH EXAM EST PT 1/>: CPT | Mod: 25

## 2021-05-17 PROCEDURE — 92134 CPTRZ OPH DX IMG PST SGM RTA: CPT

## 2021-05-17 ASSESSMENT — VISUAL ACUITY
OS_SC: 20/100-1
OD_SC: 20/100

## 2021-05-17 ASSESSMENT — TONOMETRY
OS_IOP_MMHG: 13
OD_IOP_MMHG: 15

## 2021-07-26 ENCOUNTER — FOLLOW UP (OUTPATIENT)
Dept: URBAN - METROPOLITAN AREA CLINIC 7 | Facility: CLINIC | Age: 86
End: 2021-07-26

## 2021-07-26 DIAGNOSIS — H35.3211: ICD-10-CM

## 2021-07-26 DIAGNOSIS — H40.1130: ICD-10-CM

## 2021-07-26 DIAGNOSIS — H04.123: ICD-10-CM

## 2021-07-26 DIAGNOSIS — H43.813: ICD-10-CM

## 2021-07-26 DIAGNOSIS — H35.3222: ICD-10-CM

## 2021-07-26 DIAGNOSIS — Z96.1: ICD-10-CM

## 2021-07-26 PROCEDURE — PF5 LUCENTIS PREFILLED SYRINGE

## 2021-07-26 PROCEDURE — 92014 COMPRE OPH EXAM EST PT 1/>: CPT | Mod: 25

## 2021-07-26 PROCEDURE — 67028 INJECTION EYE DRUG: CPT

## 2021-07-26 PROCEDURE — 92134 CPTRZ OPH DX IMG PST SGM RTA: CPT

## 2021-07-26 ASSESSMENT — VISUAL ACUITY
OD_PH: 20/80-1
OD_SC: 20/200
OS_SC: 20/200-1

## 2021-07-26 ASSESSMENT — TONOMETRY
OD_IOP_MMHG: 17
OS_IOP_MMHG: 16

## 2021-09-20 ENCOUNTER — FOLLOW UP (OUTPATIENT)
Dept: URBAN - METROPOLITAN AREA CLINIC 7 | Facility: CLINIC | Age: 86
End: 2021-09-20

## 2021-09-20 DIAGNOSIS — H43.813: ICD-10-CM

## 2021-09-20 DIAGNOSIS — H35.3211: ICD-10-CM

## 2021-09-20 DIAGNOSIS — H35.3222: ICD-10-CM

## 2021-09-20 DIAGNOSIS — H40.1130: ICD-10-CM

## 2021-09-20 DIAGNOSIS — H04.123: ICD-10-CM

## 2021-09-20 PROCEDURE — 92014 COMPRE OPH EXAM EST PT 1/>: CPT | Mod: 25

## 2021-09-20 PROCEDURE — 67028 INJECTION EYE DRUG: CPT

## 2021-09-20 PROCEDURE — 92134 CPTRZ OPH DX IMG PST SGM RTA: CPT

## 2021-09-20 PROCEDURE — PF5 LUCENTIS PREFILLED SYRINGE

## 2021-09-20 ASSESSMENT — VISUAL ACUITY
OS_SC: CF 3FT
OS_PH: 20/400
OD_SC: 20/100-1

## 2021-09-20 ASSESSMENT — TONOMETRY
OD_IOP_MMHG: 20
OS_IOP_MMHG: 20

## 2021-11-08 ENCOUNTER — FOLLOW UP (OUTPATIENT)
Dept: URBAN - METROPOLITAN AREA CLINIC 7 | Facility: CLINIC | Age: 86
End: 2021-11-08

## 2021-11-08 DIAGNOSIS — H35.3222: ICD-10-CM

## 2021-11-08 DIAGNOSIS — H40.1130: ICD-10-CM

## 2021-11-08 DIAGNOSIS — H43.813: ICD-10-CM

## 2021-11-08 DIAGNOSIS — H04.123: ICD-10-CM

## 2021-11-08 DIAGNOSIS — H35.3211: ICD-10-CM

## 2021-11-08 PROCEDURE — 67028 INJECTION EYE DRUG: CPT

## 2021-11-08 PROCEDURE — 92014 COMPRE OPH EXAM EST PT 1/>: CPT | Mod: 25

## 2021-11-08 PROCEDURE — PF5 LUCENTIS PREFILLED SYRINGE

## 2021-11-08 PROCEDURE — 92134 CPTRZ OPH DX IMG PST SGM RTA: CPT

## 2021-11-08 ASSESSMENT — VISUAL ACUITY
OD_SC: 20/100+1
OS_PH: 20/100
OS_SC: 20/400

## 2021-11-08 ASSESSMENT — TONOMETRY
OS_IOP_MMHG: 20
OD_IOP_MMHG: 20

## 2022-01-10 ENCOUNTER — FOLLOW UP (OUTPATIENT)
Dept: URBAN - METROPOLITAN AREA CLINIC 7 | Facility: CLINIC | Age: 87
End: 2022-01-10

## 2022-01-10 DIAGNOSIS — H35.3222: ICD-10-CM

## 2022-01-10 DIAGNOSIS — H35.3211: ICD-10-CM

## 2022-01-10 DIAGNOSIS — H40.1130: ICD-10-CM

## 2022-01-10 DIAGNOSIS — H43.813: ICD-10-CM

## 2022-01-10 DIAGNOSIS — H04.123: ICD-10-CM

## 2022-01-10 PROCEDURE — 67028 INJECTION EYE DRUG: CPT

## 2022-01-10 PROCEDURE — 92134 CPTRZ OPH DX IMG PST SGM RTA: CPT

## 2022-01-10 PROCEDURE — 92014 COMPRE OPH EXAM EST PT 1/>: CPT | Mod: 25

## 2022-01-10 PROCEDURE — PF5 LUCENTIS PREFILLED SYRINGE

## 2022-01-10 ASSESSMENT — VISUAL ACUITY
OS_PH: 20/300-1
OD_PH: 20/80
OD_CC: 20/80+1
OS_CC: 20/400

## 2022-01-10 ASSESSMENT — TONOMETRY
OS_IOP_MMHG: 14
OD_IOP_MMHG: 14

## 2022-04-04 ENCOUNTER — FOLLOW UP (OUTPATIENT)
Dept: URBAN - METROPOLITAN AREA CLINIC 7 | Facility: CLINIC | Age: 87
End: 2022-04-04

## 2022-04-04 DIAGNOSIS — H35.3211: ICD-10-CM

## 2022-04-04 DIAGNOSIS — H04.123: ICD-10-CM

## 2022-04-04 DIAGNOSIS — H40.1130: ICD-10-CM

## 2022-04-04 DIAGNOSIS — H35.3222: ICD-10-CM

## 2022-04-04 DIAGNOSIS — H43.813: ICD-10-CM

## 2022-04-04 PROCEDURE — 92134 CPTRZ OPH DX IMG PST SGM RTA: CPT

## 2022-04-04 PROCEDURE — 92014 COMPRE OPH EXAM EST PT 1/>: CPT

## 2022-04-04 PROCEDURE — 67028 INJECTION EYE DRUG: CPT

## 2022-04-04 PROCEDURE — PF5 LUCENTIS PREFILLED SYRINGE

## 2022-04-04 ASSESSMENT — VISUAL ACUITY
OD_SC: 20/100+2
OS_SC: 20/100+2

## 2022-04-04 ASSESSMENT — TONOMETRY
OS_IOP_MMHG: 16
OD_IOP_MMHG: 13

## 2022-06-20 ENCOUNTER — FOLLOW UP (OUTPATIENT)
Dept: URBAN - METROPOLITAN AREA CLINIC 7 | Facility: CLINIC | Age: 87
End: 2022-06-20

## 2022-06-20 DIAGNOSIS — H35.3222: ICD-10-CM

## 2022-06-20 DIAGNOSIS — H43.813: ICD-10-CM

## 2022-06-20 DIAGNOSIS — H40.1130: ICD-10-CM

## 2022-06-20 DIAGNOSIS — H04.123: ICD-10-CM

## 2022-06-20 DIAGNOSIS — H35.3211: ICD-10-CM

## 2022-06-20 PROCEDURE — 67028 INJECTION EYE DRUG: CPT

## 2022-06-20 PROCEDURE — 92134 CPTRZ OPH DX IMG PST SGM RTA: CPT

## 2022-06-20 PROCEDURE — 92014 COMPRE OPH EXAM EST PT 1/>: CPT | Mod: 25

## 2022-06-20 PROCEDURE — PF5 LUCENTIS PREFILLED SYRINGE

## 2022-06-20 ASSESSMENT — VISUAL ACUITY
OS_SC: 20/250+1
OD_SC: 20/150

## 2022-06-20 ASSESSMENT — TONOMETRY
OD_IOP_MMHG: 18
OS_IOP_MMHG: 16

## 2022-09-19 ENCOUNTER — FOLLOW UP (OUTPATIENT)
Dept: URBAN - METROPOLITAN AREA CLINIC 7 | Facility: CLINIC | Age: 87
End: 2022-09-19

## 2022-09-19 DIAGNOSIS — H40.1130: ICD-10-CM

## 2022-09-19 DIAGNOSIS — H35.3222: ICD-10-CM

## 2022-09-19 DIAGNOSIS — H35.3211: ICD-10-CM

## 2022-09-19 DIAGNOSIS — H43.813: ICD-10-CM

## 2022-09-19 DIAGNOSIS — H04.123: ICD-10-CM

## 2022-09-19 PROCEDURE — PF5 LUCENTIS PREFILLED SYRINGE

## 2022-09-19 PROCEDURE — 67028 INJECTION EYE DRUG: CPT

## 2022-09-19 PROCEDURE — 92014 COMPRE OPH EXAM EST PT 1/>: CPT | Mod: 25

## 2022-09-19 PROCEDURE — 92134 CPTRZ OPH DX IMG PST SGM RTA: CPT

## 2022-09-19 ASSESSMENT — TONOMETRY
OS_IOP_MMHG: 20
OD_IOP_MMHG: 18

## 2022-09-19 ASSESSMENT — VISUAL ACUITY
OD_SC: 20/150-1
OS_SC: 20/250

## 2022-10-24 ENCOUNTER — UNSCHEDULED FOLLOW UP (OUTPATIENT)
Dept: URBAN - METROPOLITAN AREA CLINIC 7 | Facility: CLINIC | Age: 87
End: 2022-10-24

## 2022-10-24 DIAGNOSIS — H35.3222: ICD-10-CM

## 2022-10-24 DIAGNOSIS — H35.3211: ICD-10-CM

## 2022-10-24 DIAGNOSIS — H40.1130: ICD-10-CM

## 2022-10-24 DIAGNOSIS — H43.813: ICD-10-CM

## 2022-10-24 DIAGNOSIS — H04.123: ICD-10-CM

## 2022-10-24 PROCEDURE — 92014 COMPRE OPH EXAM EST PT 1/>: CPT

## 2022-10-24 PROCEDURE — 92134 CPTRZ OPH DX IMG PST SGM RTA: CPT

## 2022-10-24 ASSESSMENT — VISUAL ACUITY
OD_SC: 20/250+1
OS_SC: 20/350+1

## 2022-10-24 ASSESSMENT — TONOMETRY
OS_IOP_MMHG: 16
OD_IOP_MMHG: 16

## 2022-12-19 ENCOUNTER — FOLLOW UP (OUTPATIENT)
Dept: URBAN - METROPOLITAN AREA CLINIC 7 | Facility: CLINIC | Age: 87
End: 2022-12-19

## 2022-12-19 DIAGNOSIS — H43.813: ICD-10-CM

## 2022-12-19 DIAGNOSIS — H04.123: ICD-10-CM

## 2022-12-19 DIAGNOSIS — H35.3211: ICD-10-CM

## 2022-12-19 DIAGNOSIS — H40.1130: ICD-10-CM

## 2022-12-19 DIAGNOSIS — H35.3222: ICD-10-CM

## 2022-12-19 PROCEDURE — 92014 COMPRE OPH EXAM EST PT 1/>: CPT | Mod: 25

## 2022-12-19 PROCEDURE — 92134 CPTRZ OPH DX IMG PST SGM RTA: CPT

## 2022-12-19 PROCEDURE — PF5 LUCENTIS PREFILLED SYRINGE

## 2022-12-19 PROCEDURE — 67028 INJECTION EYE DRUG: CPT

## 2022-12-19 ASSESSMENT — TONOMETRY
OD_IOP_MMHG: 17
OS_IOP_MMHG: 22

## 2022-12-19 ASSESSMENT — VISUAL ACUITY
OD_SC: 20/300-1
OS_SC: CF 3FT

## 2023-03-20 ENCOUNTER — FOLLOW UP (OUTPATIENT)
Dept: URBAN - METROPOLITAN AREA CLINIC 7 | Facility: CLINIC | Age: 88
End: 2023-03-20

## 2023-03-20 DIAGNOSIS — H43.813: ICD-10-CM

## 2023-03-20 DIAGNOSIS — H35.3211: ICD-10-CM

## 2023-03-20 DIAGNOSIS — H35.3222: ICD-10-CM

## 2023-03-20 DIAGNOSIS — H40.1130: ICD-10-CM

## 2023-03-20 DIAGNOSIS — H04.123: ICD-10-CM

## 2023-03-20 PROCEDURE — 67028 INJECTION EYE DRUG: CPT

## 2023-03-20 PROCEDURE — 92134 CPTRZ OPH DX IMG PST SGM RTA: CPT

## 2023-03-20 PROCEDURE — PF5 LUCENTIS PREFILLED SYRINGE

## 2023-03-20 PROCEDURE — 92014 COMPRE OPH EXAM EST PT 1/>: CPT | Mod: 25

## 2023-03-20 ASSESSMENT — TONOMETRY
OD_IOP_MMHG: 21
OS_IOP_MMHG: 23

## 2023-03-20 ASSESSMENT — VISUAL ACUITY
OD_SC: 20/400
OS_SC: 4/200